# Patient Record
Sex: MALE | Race: WHITE | Employment: FULL TIME | URBAN - METROPOLITAN AREA
[De-identification: names, ages, dates, MRNs, and addresses within clinical notes are randomized per-mention and may not be internally consistent; named-entity substitution may affect disease eponyms.]

---

## 2020-11-03 ENCOUNTER — OFFICE VISIT (OUTPATIENT)
Dept: URGENT CARE | Facility: URGENT CARE | Age: 30
End: 2020-11-03

## 2020-11-03 VITALS
BODY MASS INDEX: 23.03 KG/M2 | HEART RATE: 83 BPM | TEMPERATURE: 98.4 F | WEIGHT: 170 LBS | OXYGEN SATURATION: 98 % | SYSTOLIC BLOOD PRESSURE: 112 MMHG | HEIGHT: 72 IN | DIASTOLIC BLOOD PRESSURE: 73 MMHG

## 2020-11-03 DIAGNOSIS — N34.2 URETHRITIS: Primary | ICD-10-CM

## 2020-11-03 LAB
ALBUMIN UR-MCNC: NEGATIVE MG/DL
APPEARANCE UR: CLEAR
BILIRUB UR QL STRIP: NEGATIVE
COLOR UR AUTO: YELLOW
GLUCOSE UR STRIP-MCNC: NEGATIVE MG/DL
HGB UR QL STRIP: ABNORMAL
KETONES UR STRIP-MCNC: NEGATIVE MG/DL
LEUKOCYTE ESTERASE UR QL STRIP: NEGATIVE
NITRATE UR QL: NEGATIVE
PH UR STRIP: 6.5 PH (ref 5–7)
RBC #/AREA URNS AUTO: NORMAL /HPF
SOURCE: ABNORMAL
SP GR UR STRIP: 1.01 (ref 1–1.03)
UROBILINOGEN UR STRIP-ACNC: 0.2 EU/DL (ref 0.2–1)
WBC #/AREA URNS AUTO: NORMAL /HPF

## 2020-11-03 PROCEDURE — 99203 OFFICE O/P NEW LOW 30 MIN: CPT | Performed by: FAMILY MEDICINE

## 2020-11-03 PROCEDURE — 81001 URINALYSIS AUTO W/SCOPE: CPT | Performed by: FAMILY MEDICINE

## 2020-11-03 RX ORDER — PHENAZOPYRIDINE HYDROCHLORIDE 200 MG/1
200 TABLET, FILM COATED ORAL 3 TIMES DAILY
Qty: 6 TABLET | Refills: 0 | Status: SHIPPED | OUTPATIENT
Start: 2020-11-03 | End: 2021-04-08

## 2020-11-03 ASSESSMENT — ENCOUNTER SYMPTOMS
FREQUENCY: 0
DYSURIA: 1
FEVER: 0

## 2020-11-03 ASSESSMENT — MIFFLIN-ST. JEOR: SCORE: 1769.11

## 2020-11-04 NOTE — PATIENT INSTRUCTIONS
Patient Education     Urethritis in Men     An inflamed urethra can cause pain during urination.   The urethra is the tube that runs from the bladder through the penis. When the urethra is inflamed, it is called urethritis. The urethra becomes swollen and causes burning pain when you urinate. Other symptoms of urethritis may include itching or tingling of the penis or pus discharge from the penis. You may also have pain with sex and masturbation. Some men may not have symptoms.  What causes urethritis?  Urethritis can be caused by a bacterial or viral infection. Such an infection can lead to conditions such as a urinary tract infection (UTI) or sexually transmitted diseases (STD). Urethritis can also be caused by injury or sensitivity or allergy to chemicals in lotions and other products.  How is urethritis diagnosed?  Your healthcare provider will examine you and ask about your symptoms and health history. You may also have one or more of the following tests:    Urine test to take samples of urine and have them checked for problems.    Blood test to take a sample of blood and have it checked for problems.    Urethral discharge to take a sample of fluid from inside the urethra. A cotton swab is inserted into the opening of the penis and into the urethra.    Cystoscopy to allow the healthcare provider to look for problems in the urinary tract. The test uses a thin, flexible telescope called a cystoscope with a light and camera attached. The scope is inserted into the urethra.  How is urethritis treated?  Treatment depends on the cause of urethritis. If it s due to a bacterial infection, antibiotics (medicines that fight infection) will be given. Your healthcare provider can tell you more about your treatment options. In the meantime, your symptoms can be treated. To relieve pain and swelling, anti-inflammatory medications, such as ibuprofen, may be given. Untreated, symptoms may get worse. Also, scar tissue can form  in the urethra, causing it to narrow.  When to call your healthcare provider   Call your healthcare provider right away if you have any of the following:    Fever of 100.4 F (38.0 C) or higher     Blood in the urine or semen    Burning pain with urination    Increased urge to urinate    Discharge from the penis    Itching, tenderness, or swelling in the penis or groin    Pain during sex or masturbation   Preventing STDs  When it comes to sex, it s important to take care and be safe. Any sexual contact with the penis, vagina, anus, or mouth can spread an STD. The only sure way to prevent STDs is not having sex. But there are ways to make sex safer. Use a latex condom each time you have sex. And talk to your partner about STDs before you have sex.  Lg last reviewed this educational content on 1/1/2017 2000-2020 [employer]. [address] [place] [address]. All rights reserved. This information is not intended as a substitute for professional medical care. Always follow your healthcare professional's instructions.

## 2020-11-04 NOTE — PROGRESS NOTES
Subjective     Rogelio García is a 30 year old male who presents to clinic today for the following health issues:    HPI         Patient is a pleasant 30-year-old male who presents to urgent care accompanied by his girlfriend for concerns of pain with urination.  Symptoms x5 days, this is new as he is never experienced this before.  He first noticed the symptoms when he was trying to use sexual lubrication and it caused a burning sensation at the tip of his penis.  Now he has noticed that there is pain and discomfort when he urinates.  He denies any penile discharge, reports he is not concerned for sexually transmitted infections as he has been only intimate with one single partner for a long time.  No previous history of sexually transmitted infections.    Has also noticed some mouth sores in the past but none today.  No new medications.  No previous skin conditions such as Oconnor-Hernando syndrome, or eczema.  Denies any groin lumps, no fevers or migratory joint pains.    Denies additional symptoms of penile curvature, no urine spraying to the sides.    Review of Systems   Constitutional: Negative for fever.   Genitourinary: Positive for dysuria. Negative for frequency and genital sores.      Past Medical History:   Diagnosis Date     Urethritis          Objective    /73   Pulse 83   Temp 98.4  F (36.9  C) (Oral)   Ht 1.829 m (6')   Wt 77.1 kg (170 lb)   SpO2 98%   BMI 23.06 kg/m    Body mass index is 23.06 kg/m .  Physical Exam  Vitals signs reviewed.   Constitutional:       Appearance: He is not ill-appearing.   Genitourinary:     Comments: Circumcised penis, no active lesions.  Glans and penile shaft is nontender to palpation.  No expressible urethral discharge.  Testicles normal.  No inguinal adenopathy.        Results for orders placed or performed in visit on 11/03/20 (from the past 24 hour(s))   *UA reflex to Microscopic and Culture ([facility] ([facility] and  [facility])    Specimen: Midstream Urine   Result Value Ref Range    Color Urine Yellow     Appearance Urine Clear     Glucose Urine Negative NEG^Negative mg/dL    Bilirubin Urine Negative NEG^Negative    Ketones Urine Negative NEG^Negative mg/dL    Specific Gravity Urine 1.010 1.003 - 1.035    Blood Urine Trace (A) NEG^Negative    pH Urine 6.5 5.0 - 7.0 pH    Protein Albumin Urine Negative NEG^Negative mg/dL    Urobilinogen Urine 0.2 0.2 - 1.0 EU/dL    Nitrite Urine Negative NEG^Negative    Leukocyte Esterase Urine Negative NEG^Negative    Source Midstream Urine    Urine Microscopic   Result Value Ref Range    WBC Urine 0 - 5 OTO5^0 - 5 /HPF    RBC Urine O - 2 OTO2^O - 2 /HPF           Assessment & Plan     Urethritis  Acute problem.  Urine urinary analysis without evidence of urinary tract infection.  Patient declined STI testing today. Advised to follow-up in 5 days if symptoms not improved after treatment with Pyridium. At that time, recommend repeat urinalysis, STI testing possible referral to urology at that time.  History not suggestive of other causes such as urethral stricture.   - *UA reflex to Microscopic and Culture ([facility] (except [place] and [facility])  - Urine Microscopic  - phenazopyridine (PYRIDIUM) 200 MG tablet; Take 1 tablet (200 mg) by mouth 3 times daily       Return in about 5 days (around 11/8/2020) for If symptoms do not improve or gets worse..    Lucien Massey MD  [facility]

## 2020-11-12 ENCOUNTER — OFFICE VISIT (OUTPATIENT)
Dept: URGENT CARE | Facility: URGENT CARE | Age: 30
End: 2020-11-12

## 2020-11-12 VITALS
HEART RATE: 65 BPM | WEIGHT: 170 LBS | OXYGEN SATURATION: 97 % | DIASTOLIC BLOOD PRESSURE: 66 MMHG | TEMPERATURE: 98.7 F | BODY MASS INDEX: 23.06 KG/M2 | SYSTOLIC BLOOD PRESSURE: 116 MMHG

## 2020-11-12 DIAGNOSIS — R39.89 GENITAL SORE: ICD-10-CM

## 2020-11-12 DIAGNOSIS — R30.0 DYSURIA: Primary | ICD-10-CM

## 2020-11-12 LAB
ALBUMIN UR-MCNC: NEGATIVE MG/DL
APPEARANCE UR: CLEAR
BILIRUB UR QL STRIP: NEGATIVE
COLOR UR AUTO: YELLOW
GLUCOSE UR STRIP-MCNC: NEGATIVE MG/DL
HGB UR QL STRIP: NEGATIVE
KETONES UR STRIP-MCNC: NEGATIVE MG/DL
LEUKOCYTE ESTERASE UR QL STRIP: NEGATIVE
NITRATE UR QL: NEGATIVE
PH UR STRIP: 7 PH (ref 5–7)
SOURCE: NORMAL
SP GR UR STRIP: 1.02 (ref 1–1.03)
UROBILINOGEN UR STRIP-ACNC: 0.2 EU/DL (ref 0.2–1)

## 2020-11-12 PROCEDURE — 86695 HERPES SIMPLEX TYPE 1 TEST: CPT | Performed by: PHYSICIAN ASSISTANT

## 2020-11-12 PROCEDURE — 86696 HERPES SIMPLEX TYPE 2 TEST: CPT | Performed by: PHYSICIAN ASSISTANT

## 2020-11-12 PROCEDURE — 81003 URINALYSIS AUTO W/O SCOPE: CPT | Performed by: NURSE PRACTITIONER

## 2020-11-12 PROCEDURE — 36415 COLL VENOUS BLD VENIPUNCTURE: CPT | Performed by: PHYSICIAN ASSISTANT

## 2020-11-12 PROCEDURE — 99213 OFFICE O/P EST LOW 20 MIN: CPT | Performed by: PHYSICIAN ASSISTANT

## 2020-11-12 ASSESSMENT — ENCOUNTER SYMPTOMS
RESPIRATORY NEGATIVE: 1
PSYCHIATRIC NEGATIVE: 1
FLANK PAIN: 0
FREQUENCY: 0
DIFFICULTY URINATING: 0
GASTROINTESTINAL NEGATIVE: 1
CARDIOVASCULAR NEGATIVE: 1
HEMATURIA: 0
CONSTITUTIONAL NEGATIVE: 1
NEUROLOGICAL NEGATIVE: 1
DYSURIA: 1
EYES NEGATIVE: 1

## 2020-11-13 NOTE — PATIENT INSTRUCTIONS
Patient Education     Dysuria  Dysuria is when you have pain during urination. It is often described as a burning feeling. Learn more about this problem and how it can be treated.      Painful urination (dysuria) is often caused by a problem in the urinary tract.   What causes dysuria?  Possible causes include:    Infection with a bacteria or virus such as a urinary tract infection (UTI) or a sexually transmitted infection (STI)    Sensitivity or allergy to chemicals such as those found in lotions and other products    Prostate or bladder problems    Radiation therapy to the pelvic area  How is dysuria diagnosed?  Your healthcare provider will examine you. He or she will ask about your symptoms and health. After talking with you and doing a physical exam, your healthcare provider may know what is causing your dysuria. You will often have to give a urine sample. Tests of your urine (urinalysis) are done. A urinalysis may include:     Looking at the urine sample (visual exam)    Checking for substances (chemical exam)    Looking at a small amount of the urine under a microscope (microscopic exam)  Some parts of the urinalysis may be done in the provider's office and some in a lab. The urine sample may also be checked for bacteria and yeast (urine culture). Your healthcare provider will tell you more about these tests if they are needed.   How is dysuria treated?  Treatment depends on the cause. If you have a bacterial infection, you may need antibiotics. You may be given medicines to make it easier for you to urinate and help ease pain. Your healthcare provider can tell you more about your treatment options. If not treated, symptoms may get worse.   When to call your healthcare provider  Call the healthcare provider right away if you have any of the following:     Fever of  100.4  F ( 38 C) or higher, or as directed by your provider    No improvement after 3 days of treatment    Trouble urinating because of  pain    New or increased discharge from the vagina or penis    Rash or joint pain    Increased back or belly pain    Enlarged painful lymph nodes (lumps) in the groin  [employer] last reviewed this educational content on 4/1/2019 2000-2020 [employer]. [address] [place] [address]. All rights reserved. This information is not intended as a substitute for professional medical care. Always follow your healthcare professional's instructions.

## 2020-11-13 NOTE — PROGRESS NOTES
SUBJECTIVE:   Rogelio García is a 30 year old male presenting with a chief complaint of   Chief Complaint   Patient presents with     Urgent Care     UTI     c/o dysuria for 2 weeks       He is an established patient of [facility].    Patient has been having dysuria for the last 2 weeks shortly after having used a new lubricant. Patient visited clinic on 11/03/2020 and his UA was normal at that time. Patient and his monogamous partner say that there is no chance for STI as they have both been exclusively sexually active with eachother. Patient aslo states that he has been noticing small blisters forming around his penis.       Review of Systems   Constitutional: Negative.    HENT: Negative.    Eyes: Negative.    Respiratory: Negative.    Cardiovascular: Negative.    Gastrointestinal: Negative.    Genitourinary: Positive for dysuria and genital sores. Negative for decreased urine volume, difficulty urinating, discharge, enuresis, flank pain, frequency, hematuria, penile pain, penile swelling, scrotal swelling, testicular pain and urgency.   Skin: Negative.    Neurological: Negative.    Psychiatric/Behavioral: Negative.        Past Medical History:   Diagnosis Date     Urethritis      No family history on file.  Current Outpatient Medications   Medication Sig Dispense Refill     phenazopyridine (PYRIDIUM) 200 MG tablet Take 1 tablet (200 mg) by mouth 3 times daily 6 tablet 0     Social History     Tobacco Use     Smoking status: Light Tobacco Smoker     Smokeless tobacco: Current User     Tobacco comment: pt states he vaps   Substance Use Topics     Alcohol use: Not on file       OBJECTIVE  /66   Pulse 65   Temp 98.7  F (37.1  C) (Oral)   Wt 77.1 kg (170 lb)   SpO2 97%   BMI 23.06 kg/m      Physical Exam  Constitutional:       General: He is not in acute distress.     Appearance: Normal appearance. He is normal weight. He is not ill-appearing, toxic-appearing or diaphoretic.   HENT:      Head:  Normocephalic and atraumatic.   Cardiovascular:      Rate and Rhythm: Normal rate and regular rhythm.      Pulses: Normal pulses.      Heart sounds: Normal heart sounds. No murmur. No friction rub. No gallop.    Pulmonary:      Effort: Pulmonary effort is normal. No respiratory distress.      Breath sounds: Normal breath sounds. No stridor. No wheezing, rhonchi or rales.   Chest:      Chest wall: No tenderness.   Genitourinary:     Pubic Area: No rash or pubic lice.       Penis: Normal and circumcised. No phimosis, paraphimosis, erythema, discharge or swelling.       Testes: Normal.          Comments: Small vesicular blisters in the areas shown above.   Neurological:      General: No focal deficit present.      Mental Status: He is alert and oriented to person, place, and time. Mental status is at baseline.      Gait: Gait normal.   Psychiatric:         Mood and Affect: Mood normal.         Behavior: Behavior normal.         Thought Content: Thought content normal.         Judgment: Judgment normal.       ASSESSMENT/PLAN:    (R30.0) Dysuria  (primary encounter diagnosis)  Plan: *UA reflex to Microscopic and Culture ([place]         and [facility] (except [place] and         [facility]), UROLOGY ADULT REFERRAL, Herpes         Simplex Virus 1 and 2 IgG    (R39.89) Genital sore  Plan: Herpes Simplex Virus 1 and 2 IgG    Although patient states that an STI is not possible, I would like to rule out herpes as a possibility.     If Herpes test is normal and symptoms persist beyond 1 week, I would like the patient to follow up with urology for further workup.     Jose Marin PA-C on 11/12/2020 at 8:42 PM

## 2020-11-16 LAB
HSV1 IGG SERPL QL IA: <0.2 AI (ref 0–0.8)
HSV2 IGG SERPL QL IA: <0.2 AI (ref 0–0.8)

## 2021-04-08 ENCOUNTER — OFFICE VISIT (OUTPATIENT)
Dept: FAMILY MEDICINE | Facility: CLINIC | Age: 31
End: 2021-04-08

## 2021-04-08 ENCOUNTER — ANCILLARY PROCEDURE (OUTPATIENT)
Dept: GENERAL RADIOLOGY | Facility: CLINIC | Age: 31
End: 2021-04-08
Attending: INTERNAL MEDICINE

## 2021-04-08 VITALS
OXYGEN SATURATION: 97 % | HEIGHT: 72 IN | DIASTOLIC BLOOD PRESSURE: 83 MMHG | RESPIRATION RATE: 16 BRPM | WEIGHT: 174 LBS | BODY MASS INDEX: 23.57 KG/M2 | HEART RATE: 77 BPM | SYSTOLIC BLOOD PRESSURE: 137 MMHG | TEMPERATURE: 98.6 F

## 2021-04-08 DIAGNOSIS — S62.326D CLOSED DISPLACED FRACTURE OF SHAFT OF FIFTH METACARPAL BONE OF RIGHT HAND WITH ROUTINE HEALING, SUBSEQUENT ENCOUNTER: Primary | ICD-10-CM

## 2021-04-08 PROCEDURE — 99213 OFFICE O/P EST LOW 20 MIN: CPT | Performed by: INTERNAL MEDICINE

## 2021-04-08 PROCEDURE — 73140 X-RAY EXAM OF FINGER(S): CPT | Mod: RT | Performed by: RADIOLOGY

## 2021-04-08 RX ORDER — OXYCODONE AND ACETAMINOPHEN 5; 325 MG/1; MG/1
1 TABLET ORAL EVERY 6 HOURS PRN
Qty: 20 TABLET | Refills: 0 | Status: SHIPPED | OUTPATIENT
Start: 2021-04-08

## 2021-04-08 ASSESSMENT — MIFFLIN-ST. JEOR: SCORE: 1787.26

## 2021-04-08 ASSESSMENT — PAIN SCALES - GENERAL: PAINLEVEL: SEVERE PAIN (7)

## 2021-04-08 NOTE — LETTER
REPORT OF WORK ABILITY    [address] [address]  [phone]      PATIENT DATA    Employee Name: Rogelio García      : 1990     SS#: [id number]    Today's date: 2021  Date of injury: 2021  Date of first visit: 2021 ([facility]    PROVIDER EVALUATION: Please fill in as needed.  Please give copy to employee for employer.    1. Diagnosis: Fracture of right 5th distal metacarpal    2. Treatment: Rest and pharmacotherapy.  3. Medication: Oxycodone  5. Return to work date: 2021   ** WITH RESTRICTIONS? Yes, with work restrictions: * Restricted lifting - Maximum lift in pounds: 20 on left upper extremity. No weightlifting on right upper extremity due to above condition DURATION OF LIMITATIONS: Approximately 4 weeks, but it may change depending on his recovery.    Maximum Medical Improvement (Date): Cannot be determined yet.  Any Permanent Partial Disability? 0%    Provider comments: Future appointment with Orthopedics on 2021 at   [facility] (2:40 PM)    Medical Examiner: Elmer Andino MD          License or registration: [place] [id number]    Next appointment: As needed    CC: Employer, Managed Care Plan/Payor, Patient

## 2021-04-08 NOTE — PATIENT INSTRUCTIONS
At [facility], we strive to deliver an exceptional experience to you, every time we see you. If you receive a survey, please complete it as we do value your feedback.  If you have MyChart, you can expect to receive results automatically within 24 hours of their completion.  Your provider will send a note interpreting your results as well.   If you do not have MyChart, you should receive your results in about a week by mail.    Your care team:                            Family Medicine Internal Medicine   MD Elmer Aiken MD Shantel Branch-Fleming, MD Srinivasa Vaka, MD Katya Belousova, PAARELI Wilson, APRN CNP    Sandeep Stone, MD Pediatrics   Gigi Morales, PAARELI Montalvo, CNP MD Alma Curtis APRN CNP   MD Yenifer Ward MD Deborah Mielke, MD Lynn Chapman, APRN [facility]      Clinic hours: Monday - Thursday 7 am-6 pm; Fridays 7 am-5 pm.   Urgent care: Monday - Friday 10 am- 8 pm; Saturday and Sunday 9 am-5 pm.    Clinic: [phone]       [facility]: Monday - Thursday 8 am - 7 pm; Friday 8 am - 6 pm  [facility]: [phone]     Use [email or web address] for 24/7 diagnosis and treatment of dozens of conditions.
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
## 2021-04-10 ENCOUNTER — OFFICE VISIT (OUTPATIENT)
Dept: ORTHOPEDICS | Facility: CLINIC | Age: 31
End: 2021-04-10

## 2021-04-10 VITALS
BODY MASS INDEX: 23.57 KG/M2 | SYSTOLIC BLOOD PRESSURE: 127 MMHG | DIASTOLIC BLOOD PRESSURE: 74 MMHG | WEIGHT: 174 LBS | HEIGHT: 72 IN

## 2021-04-10 DIAGNOSIS — S62.339A CLOSED BOXER'S FRACTURE, INITIAL ENCOUNTER: Primary | ICD-10-CM

## 2021-04-10 DIAGNOSIS — S69.91XA HAND INJURY, RIGHT, INITIAL ENCOUNTER: ICD-10-CM

## 2021-04-10 PROCEDURE — 99204 OFFICE O/P NEW MOD 45 MIN: CPT | Performed by: FAMILY MEDICINE

## 2021-04-10 ASSESSMENT — MIFFLIN-ST. JEOR: SCORE: 1787.26

## 2021-04-10 NOTE — LETTER
[facility] [place]  [address] [address]  [place] [address]  [phone]    April 10, 2021    RE:  Rogelio García                                                                                                                                                       [address] [place] [address]       Employee Name: Rogelio García      : 1990     SS#: [id number]    Today's date: 2021  Date of injury: 2021  Date of first visit: 2021 ([facility]    PROVIDER EVALUATION:      1. Diagnosis: Fracture of right 5th distal metacarpal  2. Treatment: Rest and pharmacotherapy.  3. Medication: Limited tylenol/ibuprofen for pain for 1-2 weeks  5. Return to work date: 2021   ** WITH RESTRICTIONS? Yes, with work restrictions: * Restricted lifting - Maximum lift in pounds: 20 on left upper extremity. No weightlifting on right upper extremity due to above condition DURATION OF LIMITATIONS: Approximately 4 weeks, but it may change depending on his recovery.    Maximum Medical Improvement (Date): Cannot be determined yet.  Any Permanent Partial Disability? 0%    Provider comments: Future appointment one week for repeat x-ray    Medical Examiner: Elmer Andino MD          License [place]: [place] [id number]    Next appointment: 1 week      Joseph Garcia MD
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
## 2021-04-10 NOTE — PATIENT INSTRUCTIONS
# Right 5th Metacarpal Fracture, Mildly Angulation:  Notable after injury on 4/7/21.  Pain improved now with splint.  Pain over fracture site with some swelling.  No significant scissoring of fingers.  Reviewed previous x-rays.  Plan to treat as below  Image Findings: reviewed previous x-rays  Treatment: New splint applied today  Job: letter written  Medications/Injections: Limited tylenol/ibuprofen for pain for 1-2 weeks    Follow-up: 1 week    Please call [phone]   Ask for my team if you have any questions or concerns    It was great seeing you today!    Joseph Garcia MD, CAQSM
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
## 2021-04-14 ENCOUNTER — OFFICE VISIT (OUTPATIENT)
Dept: FAMILY MEDICINE | Facility: CLINIC | Age: 31
End: 2021-04-14

## 2021-04-14 DIAGNOSIS — S62.326D CLOSED DISPLACED FRACTURE OF SHAFT OF FIFTH METACARPAL BONE OF RIGHT HAND WITH ROUTINE HEALING, SUBSEQUENT ENCOUNTER: Primary | ICD-10-CM

## 2021-04-14 PROCEDURE — 99212 OFFICE O/P EST SF 10 MIN: CPT | Performed by: FAMILY MEDICINE

## 2021-04-14 RX ORDER — HYDROCODONE BITARTRATE AND ACETAMINOPHEN 5; 325 MG/1; MG/1
1 TABLET ORAL EVERY 6 HOURS PRN
Qty: 18 TABLET | Refills: 0 | Status: SHIPPED | OUTPATIENT
Start: 2021-04-14 | End: 2021-04-17
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
## 2021-04-14 NOTE — PATIENT INSTRUCTIONS
1. Refill one time  Vicodin  As needed for broken bone  You can continue taken tylenol for pain   2 Fu with hand orthopedics  as planned

## 2021-04-15 ENCOUNTER — TELEPHONE (OUTPATIENT)
Dept: FAMILY MEDICINE | Facility: CLINIC | Age: 31
End: 2021-04-15

## 2021-04-15 NOTE — TELEPHONE ENCOUNTER
[facility] phone call message- medication clarification/question:    Full Medication Name:HYDROcodone-acetaminophen (NORCO) 5-325 MG tablet    Dose:Take 1 tablet by mouth every 6 hours as needed for pain - Oral     Question: Pt came in yesterday and saw Dr Park.  He gave the patient the medication above. The patient went to the pharmacy and picked it up.  He then realized that it was Hydrocodone and not oxycodone.  He does not want this.  He has not taken any of the medication and is wondering if the doctor will prescribe the Oxy and he will return the hydro.    Pharmacy confirmed as    [id number] - [place], [place] - [address] AT [facility] [address]  [id number] - [place], [place] - [address] AT [facility]: Yes    OK to leave a message on voice mail? Yes    Primary language: English      needed? No    Call taken on April 15, 2021 at 9:42 AM by Katey Leach

## 2021-11-19 ENCOUNTER — IMMUNIZATION (OUTPATIENT)
Dept: NURSING | Facility: CLINIC | Age: 31
End: 2021-11-19
Payer: COMMERCIAL

## 2021-11-19 PROCEDURE — 90471 IMMUNIZATION ADMIN: CPT

## 2021-11-19 PROCEDURE — 91300 PR COVID VAC PFIZER DIL RECON 30 MCG/0.3 ML IM: CPT

## 2021-11-19 PROCEDURE — 90686 IIV4 VACC NO PRSV 0.5 ML IM: CPT

## 2021-11-19 PROCEDURE — 0004A PR COVID VAC PFIZER DIL RECON 30 MCG/0.3 ML IM: CPT
